# Patient Record
Sex: FEMALE | Race: WHITE
[De-identification: names, ages, dates, MRNs, and addresses within clinical notes are randomized per-mention and may not be internally consistent; named-entity substitution may affect disease eponyms.]

---

## 2013-05-04 VITALS
SYSTOLIC BLOOD PRESSURE: 102 MMHG | SYSTOLIC BLOOD PRESSURE: 102 MMHG | DIASTOLIC BLOOD PRESSURE: 50 MMHG | SYSTOLIC BLOOD PRESSURE: 102 MMHG | DIASTOLIC BLOOD PRESSURE: 50 MMHG | SYSTOLIC BLOOD PRESSURE: 102 MMHG | DIASTOLIC BLOOD PRESSURE: 50 MMHG | DIASTOLIC BLOOD PRESSURE: 50 MMHG

## 2017-04-19 ENCOUNTER — HOSPITAL ENCOUNTER (OUTPATIENT)
Dept: HOSPITAL 44 - LAB | Age: 62
End: 2017-04-19
Attending: FAMILY MEDICINE
Payer: COMMERCIAL

## 2017-04-19 DIAGNOSIS — R53.1: Primary | ICD-10-CM

## 2017-04-19 LAB
BASOPHILS NFR BLD: 0.7 % (ref 0–1.5)
EGFR (AFRICAN): > 60
EGFR (NON-AFRICAN): > 60
EOSINOPHIL NFR BLD: 1.9 % (ref 0–6.8)
MCH RBC QN AUTO: 29.4 PG (ref 28–34)
MCV RBC AUTO: 89.6 FL (ref 80–100)
MONOCYTES %: 3.8 % (ref 0–11)
NEUTROPHILS #: 2.5 # K/UL (ref 1.4–7.7)

## 2017-04-19 PROCEDURE — 36415 COLL VENOUS BLD VENIPUNCTURE: CPT

## 2017-04-19 PROCEDURE — 84443 ASSAY THYROID STIM HORMONE: CPT

## 2017-04-19 PROCEDURE — 85025 COMPLETE CBC W/AUTO DIFF WBC: CPT

## 2017-04-19 PROCEDURE — 85651 RBC SED RATE NONAUTOMATED: CPT

## 2017-04-19 PROCEDURE — 82550 ASSAY OF CK (CPK): CPT

## 2017-04-19 PROCEDURE — 80053 COMPREHEN METABOLIC PANEL: CPT

## 2017-09-12 ENCOUNTER — HOSPITAL ENCOUNTER (OUTPATIENT)
Dept: HOSPITAL 44 - LABRHC | Age: 62
End: 2017-09-12
Attending: PHYSICIAN ASSISTANT
Payer: COMMERCIAL

## 2017-09-12 DIAGNOSIS — R82.90: Primary | ICD-10-CM

## 2017-09-12 PROCEDURE — 87086 URINE CULTURE/COLONY COUNT: CPT

## 2019-06-12 ENCOUNTER — HOSPITAL ENCOUNTER (OUTPATIENT)
Dept: HOSPITAL 44 - LAB | Age: 64
Discharge: HOME | End: 2019-06-12
Attending: FAMILY MEDICINE
Payer: COMMERCIAL

## 2019-06-12 DIAGNOSIS — R23.2: ICD-10-CM

## 2019-06-12 DIAGNOSIS — R53.83: Primary | ICD-10-CM

## 2019-06-12 DIAGNOSIS — F34.81: ICD-10-CM

## 2019-06-12 PROCEDURE — 80053 COMPREHEN METABOLIC PANEL: CPT

## 2019-06-12 PROCEDURE — 83001 ASSAY OF GONADOTROPIN (FSH): CPT

## 2019-06-12 PROCEDURE — 82672 ASSAY OF ESTROGEN: CPT

## 2019-06-12 PROCEDURE — 84403 ASSAY OF TOTAL TESTOSTERONE: CPT

## 2019-06-12 PROCEDURE — 87086 URINE CULTURE/COLONY COUNT: CPT

## 2019-06-12 PROCEDURE — 83002 ASSAY OF GONADOTROPIN (LH): CPT

## 2019-06-12 PROCEDURE — 84443 ASSAY THYROID STIM HORMONE: CPT

## 2019-06-12 PROCEDURE — 85025 COMPLETE CBC W/AUTO DIFF WBC: CPT

## 2019-06-12 PROCEDURE — 84144 ASSAY OF PROGESTERONE: CPT

## 2019-06-12 PROCEDURE — 36415 COLL VENOUS BLD VENIPUNCTURE: CPT

## 2019-06-12 PROCEDURE — 83735 ASSAY OF MAGNESIUM: CPT

## 2019-06-12 PROCEDURE — 82670 ASSAY OF TOTAL ESTRADIOL: CPT

## 2019-06-25 LAB
BASOPHILS NFR BLD: 0.4 % (ref 0–1.5)
EGFR (NON-AFRICAN): > 60
MCV RBC AUTO: 87 FL (ref 80–100)
NEUTROPHILS #: 2.4 # K/UL (ref 1.4–7.7)
TSH: 3.1 MIU/L (ref 0.47–4.68)

## 2019-10-07 ENCOUNTER — HOSPITAL ENCOUNTER (OUTPATIENT)
Dept: HOSPITAL 44 - OPSURG | Age: 64
Discharge: HOME | End: 2019-10-07
Attending: INTERNAL MEDICINE
Payer: COMMERCIAL

## 2019-10-07 DIAGNOSIS — Z86.010: ICD-10-CM

## 2019-10-07 DIAGNOSIS — D12.4: ICD-10-CM

## 2019-10-07 DIAGNOSIS — Z90.49: ICD-10-CM

## 2019-10-07 DIAGNOSIS — Z12.11: Primary | ICD-10-CM

## 2019-10-07 PROCEDURE — 45380 COLONOSCOPY AND BIOPSY: CPT

## 2019-10-07 PROCEDURE — 45388 COLONOSCOPY W/ABLATION: CPT

## 2019-10-21 NOTE — GI REPORT
DATE OF PROCEDURE:   10/07/2019   

 

REFERRING PHYSICIAN:  Dr. Martin.



PROCEDURE PERFORMED:  Colonoscopy, polypectomy, with biopsies.



SURGEON:  Donald Gerhardt, M.D., F.A.C.P.



INDICATION FOR PROCEDURE:  The patient is here for surveillance. She has had 
polyps in the past. She has had a number of abdominal surgeries. She has had a 
total hysterectomy. She has had a cholecystectomy. She has had gastric banding. 
She has had a hernia surgery. She denies any interval change in bowel habits, 
though she does have diarrhea. No bleeding. We did discuss fiber preparations at
bedtime post cholecystectomy. 



PROCEDURE MEDICATION:  Propofol, as per Anesthesia. 



DESCRIPTION OF PROCEDURE:  The Olympus video colonoscope was advanced into the 
rectum. The prep was only fair. Unfortunately, there was still some residual 
dark-colored stool that was hard to suction out completely. In the descending 
colon at about 85 cm the patient had at least a 5-6 mm sessile polyp removed 
with electrocautery. The colonoscope was advanced all the way to the cecum. On 
slow withdrawal, as best as we could see with the prep, the cecum, ascending 
colon, transverse colon  no obvious intraluminal lesions noted. We did take 
random biopsies throughout the colon because of the diarrhea and question of 
microscopic colitis. Again, the polypectomy site was in the descending colon 
near the splenic flexure. Sigmoid: Again, some residual stool. We tried to 
lavage and suction. No obvious intraluminal lesions noted, but there were 
certain areas where the visibility was poor. Retroflexion of the rectum was 
normal. The patient tolerated the procedure well.



FINDINGS:   

1. Large polyp removed from the descending colon.

2. Random biopsies, looking for microscopic colitis. 



RECOMMENDATIONS:  

1. She needs to be on a fiber preparation at bedtime daily.

2. Probable repeat colonoscopy in 5 years  she needs to be on a 2 day prep next 
time.

3. Follow up with Dr. Martin.  

 

 







DONALD GERHARDT, M.D., F.A.C.P.   

CIRA/pardeep

D:  10/07/19

R:  10/08/19

T:  10/11/19

Job#: [id number]

 

Cc:   Dr. Mario HERNANDEZ